# Patient Record
Sex: FEMALE | Race: WHITE | NOT HISPANIC OR LATINO | URBAN - METROPOLITAN AREA
[De-identification: names, ages, dates, MRNs, and addresses within clinical notes are randomized per-mention and may not be internally consistent; named-entity substitution may affect disease eponyms.]

---

## 2023-04-01 ENCOUNTER — HOSPITAL ENCOUNTER (EMERGENCY)
Facility: OTHER | Age: 48
Discharge: HOME | End: 2023-04-01
Attending: EMERGENCY MEDICINE
Payer: COMMERCIAL

## 2023-04-01 VITALS
WEIGHT: 195 LBS | BODY MASS INDEX: 27.92 KG/M2 | RESPIRATION RATE: 18 BRPM | HEIGHT: 70 IN | TEMPERATURE: 98 F | OXYGEN SATURATION: 98 % | DIASTOLIC BLOOD PRESSURE: 73 MMHG | HEART RATE: 70 BPM | SYSTOLIC BLOOD PRESSURE: 138 MMHG

## 2023-04-01 DIAGNOSIS — R05.9 COUGH: ICD-10-CM

## 2023-04-01 DIAGNOSIS — J06.9 VIRAL URI WITH COUGH: Primary | ICD-10-CM

## 2023-04-01 DIAGNOSIS — R07.9 CHEST PAIN: ICD-10-CM

## 2023-04-01 LAB
CTP QC/QA: YES
CTP QC/QA: YES
POC MOLECULAR INFLUENZA A AGN: NEGATIVE
POC MOLECULAR INFLUENZA B AGN: NEGATIVE
SARS-COV-2 RDRP RESP QL NAA+PROBE: NEGATIVE
TROPONIN I SERPL DL<=0.01 NG/ML-MCNC: <0.006 NG/ML (ref 0–0.03)

## 2023-04-01 PROCEDURE — 25000003 PHARM REV CODE 250: Performed by: EMERGENCY MEDICINE

## 2023-04-01 PROCEDURE — 99285 EMERGENCY DEPT VISIT HI MDM: CPT | Mod: 25

## 2023-04-01 PROCEDURE — 93010 ELECTROCARDIOGRAM REPORT: CPT | Mod: ,,, | Performed by: INTERNAL MEDICINE

## 2023-04-01 PROCEDURE — 84484 ASSAY OF TROPONIN QUANT: CPT | Performed by: EMERGENCY MEDICINE

## 2023-04-01 PROCEDURE — 93005 ELECTROCARDIOGRAM TRACING: CPT

## 2023-04-01 PROCEDURE — 93010 EKG 12-LEAD: ICD-10-PCS | Mod: 76,,, | Performed by: INTERNAL MEDICINE

## 2023-04-01 RX ORDER — FLUTICASONE PROPIONATE 50 MCG
1 SPRAY, SUSPENSION (ML) NASAL 2 TIMES DAILY PRN
Qty: 15 G | Refills: 0 | Status: SHIPPED | OUTPATIENT
Start: 2023-04-01

## 2023-04-01 RX ORDER — BENZONATATE 100 MG/1
100 CAPSULE ORAL 3 TIMES DAILY PRN
Qty: 20 CAPSULE | Refills: 0 | Status: SHIPPED | OUTPATIENT
Start: 2023-04-01 | End: 2023-04-11

## 2023-04-01 RX ORDER — IBUPROFEN 600 MG/1
600 TABLET ORAL
Status: COMPLETED | OUTPATIENT
Start: 2023-04-01 | End: 2023-04-01

## 2023-04-01 RX ORDER — CETIRIZINE HYDROCHLORIDE 10 MG/1
10 TABLET ORAL DAILY
Qty: 30 TABLET | Refills: 0 | Status: SHIPPED | OUTPATIENT
Start: 2023-04-01 | End: 2024-03-31

## 2023-04-01 RX ORDER — IBUPROFEN 600 MG/1
600 TABLET ORAL EVERY 8 HOURS
Qty: 30 TABLET | Refills: 0 | Status: SHIPPED | OUTPATIENT
Start: 2023-04-01 | End: 2023-04-11

## 2023-04-01 RX ADMIN — IBUPROFEN 600 MG: 600 TABLET, FILM COATED ORAL at 09:04

## 2023-04-01 NOTE — Clinical Note
"Lis Haywood" Db was seen and treated in our emergency department on 4/1/2023.  She may return to work on 04/03/2023.       If you have any questions or concerns, please don't hesitate to call.      Lefty White RN    "

## 2023-04-02 NOTE — ED PROVIDER NOTES
"SCRIBE #1 NOTE: I, Liang Tillman, am scribing for, and in the presence of,  Maikol Rubalcava DO. I have scribed the following portions of the note - Other sections scribed: HPI, PE.       Source of History:  Patient    Chief complaint:  Chest Pain (Patient presents to the ED with complaints of having chest pain, congestion, generalized weakness, nausea, sore throat, and headache. States symptoms started yesterday and has taken two home covid tests that were negative. ) and URI      HPI:  Lis Ace is a 47 y.o. female presenting with chest tightness beginning 3/30/23 and worsening this AM. She reports that her CP is worse with sitting upright. She also complains of intermittent episodes of nausea and diarrhea, an intermittent headache, and a mildly productive cough. She has also felt fatigued for approximately 1 week. She notes a Fhx of DM and HTN.     This is the extent to the patients complaints today here in the emergency department.    ROS:   See HPI.    Review of patient's allergies indicates:  No Known Allergies    PMH:  As per HPI and below:  No past medical history on file.  No past surgical history on file.         Physical Exam:    /74   Pulse 71   Temp 98.2 °F (36.8 °C) (Oral)   Resp 17   Ht 5' 10" (1.778 m)   Wt 88.5 kg (195 lb)   SpO2 97%   BMI 27.98 kg/m²   Nursing note and vital signs reviewed.  Constitutional: No acute distress.  Nontoxic  HENT: Some mild oropharyngeal erythema. No tonsillar swelling or exudates. Uvula midline. There is nasal congestion and turbinates are swollen.  Cardiovascular:  Regular rate and rhythm no murmurs gallops or rubs  Chest/ Respiratory:  Clear to auscultation bilaterally without wheezing rhonchi or rales  Abdomen: Soft.  Not distended.  Nontender.  No guarding.  No rebound. Non-peritoneal.  Extremities: No pitting edema  Neuro: Alert. No focal deficits.  Skin: no rash noted    I decided to obtain the patient's medical records.  Summary of Medical " Records:  04/01/2023 was able to review some of the records from [facility] in which the patient presented to earlier.  She had a CBC which showed no significant abnormalities.  Also a CMP which showed no significant abnormalities.  Was unable to visualize any chest x-ray or EKG and did not see a troponin ordered.  Was unable to see the results of the COVID test.    MDM/ Differential Dx:  47-year-old female with upper respiratory like symptoms.  Differential would include COVID, pneumonia, influenza.  Also possible pericarditis.  Will review records from [facility].  Will get an EKG as well as troponin and a chest x-ray.    Social Concerns:      ED Course as of 04/01/23 2321   Sat Apr 01, 2023   2147 X-Ray Chest PA And Lateral  Chest x-ray independently interpreted by myself shows normal cardiac size, no infiltrate or focal consolidation, no pneumothorax, no bony abnormalities.  Overall impression negative chest x-ray. []   2241 Troponin I: <0.006 []   2241 POC Molecular Influenza A Ag: Negative [SM]   2241 POC Molecular Influenza B Ag: Negative [SM]   2241 SARS-CoV-2 RNA, Amplification, Qual: Negative [SM]   2319 No further workup is indicated in the emergency department today.  I updated pt regarding results and I counseled pt regarding supportive care measures.  Diagnosis and treatment plan explained to patient. I have answered all questions and the patient is satisfied with the plan of care. Patient discharged home in stable condition.  [SM]      ED Course User Index  [SM] DO Francesca Solomon Attestation:   Scribe #1: I performed the above scribed service and the documentation accurately describes the services I performed. I attest to the accuracy of the note.  I, Dr Maikol Rubalcava, personally performed the services described in this documentation. All medical record entries made by the scribe were at my direction and in my presence. I have reviewed the  chart and agree that the record reflects my personal performance and is accurate and complete.   Diagnostic Impression:    1. Viral URI with cough    2. Chest pain    3. Cough         ED Disposition Condition    Discharge Stable            ED Prescriptions       Medication Sig Dispense Start Date End Date Auth. Provider    ibuprofen (ADVIL,MOTRIN) 600 MG tablet Take 1 tablet (600 mg total) by mouth every 8 (eight) hours. for 10 days 30 tablet 4/1/2023 4/11/2023 Maikol Rubalcava, DO    fluticasone propionate (FLONASE) 50 mcg/actuation nasal spray 1 spray (50 mcg total) by Each Nostril route 2 (two) times daily as needed (congestion). 15 g 4/1/2023 -- Maikol Rubalcava DO    benzonatate (TESSALON) 100 MG capsule Take 1 capsule (100 mg total) by mouth 3 (three) times daily as needed. 20 capsule 4/1/2023 4/11/2023 Maikol Rubalcava DO    cetirizine (ZYRTEC) 10 MG tablet Take 1 tablet (10 mg total) by mouth once daily. 30 tablet 4/1/2023 3/31/2024 Maikol Rubalcava, DO          Follow-up Information       Follow up With Specialties Details Why Contact Info    [facility] Internal Medicine, Family Medicine Schedule an appointment as soon as possible for a visit   [address] [address]  [phone]      [facility] Emergency Dept Emergency Medicine  If symptoms worsen [address] [id number] [phone]             Maikol Rubalcava DO  04/01/23 8087

## 2023-04-02 NOTE — ED NOTES
Patient reports having just left [facility] emergency room due to wait time. Reports having blood work, EKG, and chest xray completed.

## 2023-04-02 NOTE — ED TRIAGE NOTES
Pt reports having URI symptoms with cough, congestion, and SOB with CP starting today - reports pain to right chest worse with movement and deep breath

## 2024-09-10 ENCOUNTER — TELEPHONE (OUTPATIENT)
Dept: HEMATOLOGY/ONCOLOGY | Facility: CLINIC | Age: 49
End: 2024-09-10
Payer: COMMERCIAL

## 2024-09-27 ENCOUNTER — TELEPHONE (OUTPATIENT)
Dept: HEMATOLOGY/ONCOLOGY | Facility: CLINIC | Age: 49
End: 2024-09-27
Payer: COMMERCIAL

## 2024-09-27 NOTE — TELEPHONE ENCOUNTER
----- Message from Slime Srinivasan MA sent at 9/27/2024  1:04 PM CDT -----    ----- Message -----  From: Becky Mckeon  Sent: 9/27/2024  12:08 PM CDT  To: [facility] Genetics Clinical Support Staff    Good Afternoon,   Type:  Patient Returning Call    Who Called: Self     Who Left Message for Patient: Cassandra    Does the patient know what this is regarding?: scheduling Genetics appointment    Would the patient rather a call back or a response via [facility]? Call     Best Call Back Number:.[phone] (home)       Thank you   [facility]

## 2024-11-12 ENCOUNTER — TELEPHONE (OUTPATIENT)
Dept: HEMATOLOGY/ONCOLOGY | Facility: CLINIC | Age: 49
End: 2024-11-12
Payer: COMMERCIAL

## 2024-11-12 NOTE — TELEPHONE ENCOUNTER
Called patient and set up a genetic appointment----- Message from Junior Palencia sent at 11/12/2024  3:05 PM CST -----  Contact: FAX    ----- Message -----  From: Glenna Israel  Sent: 11/12/2024   3:03 PM CST  To: [facility] Genetics Clinical Support Staff    11/12/24      Pt called to schedule, referral in media mgr- phone .[phone] (home)           Thanks        Glenna        CC

## 2024-12-23 ENCOUNTER — TELEPHONE (OUTPATIENT)
Dept: HEMATOLOGY/ONCOLOGY | Facility: CLINIC | Age: 49
End: 2024-12-23
Payer: COMMERCIAL

## 2024-12-23 NOTE — TELEPHONE ENCOUNTER
Called patient and left message if she would like a sooner genetic  appointment to please call me back to let me know.

## 2024-12-26 ENCOUNTER — TELEPHONE (OUTPATIENT)
Dept: HEMATOLOGY/ONCOLOGY | Facility: CLINIC | Age: 49
End: 2024-12-26
Payer: COMMERCIAL

## 2024-12-27 ENCOUNTER — TELEPHONE (OUTPATIENT)
Dept: HEMATOLOGY/ONCOLOGY | Facility: CLINIC | Age: 49
End: 2024-12-27
Payer: COMMERCIAL

## 2024-12-27 NOTE — TELEPHONE ENCOUNTER
Called patient and left message to confirm the in office appointment on 12/30/24 and to complete the genetic questionnaire and to please call back to confirm the appointment.

## 2024-12-30 ENCOUNTER — OFFICE VISIT (OUTPATIENT)
Dept: HEMATOLOGY/ONCOLOGY | Facility: CLINIC | Age: 49
End: 2024-12-30
Payer: COMMERCIAL

## 2024-12-30 ENCOUNTER — LAB VISIT (OUTPATIENT)
Dept: LAB | Facility: HOSPITAL | Age: 49
End: 2024-12-30
Payer: COMMERCIAL

## 2024-12-30 DIAGNOSIS — Z84.89 FAMILY HISTORY OF GENETIC DISEASE: ICD-10-CM

## 2024-12-30 DIAGNOSIS — Z71.83 ENCOUNTER FOR NONPROCREATIVE GENETIC COUNSELING: ICD-10-CM

## 2024-12-30 LAB — MISCELLANEOUS GENETIC TEST NAME: NORMAL

## 2024-12-30 PROCEDURE — 36415 COLL VENOUS BLD VENIPUNCTURE: CPT | Performed by: GENETIC COUNSELOR, MS

## 2024-12-30 PROCEDURE — 99999 PR PBB SHADOW E&M-EST. PATIENT-LVL II: CPT | Mod: PBBFAC,,,

## 2024-12-30 PROCEDURE — 96040 PR GENETIC COUNSELING, EACH 30 MIN: CPT | Mod: S$GLB,,,

## 2024-12-30 NOTE — PROGRESS NOTES
Cancer Genetics  Hereditary and High-Risk Clinic  Department of Hematology and Oncology  [facility]    Date of Service:  24  Visit Provider:  [facility]    Patient ID  Name: Lis Ace    : 1975    MRN: [id number]      Referring Provider  Self, Aaareferral  No address on file    Televisit Information  The patient location is: [place].    The chief complaint leading to consultation is:  As below.    Visit type: In-person.      Face-to-face time with patient:  Approximately 50 minutes.    Approximately 75 minutes in total were spent on this encounter, which includes face-to-face time and non-face-to-face time preparing to see the patient (e.g., review of records and tests), obtaining and/or reviewing separately obtained history, documenting clinical information in the electronic or other health record, independently interpreting results (not separately reported) and communicating results to the patient/family/caregiver, or care coordination (not separately reported).  Each patient to whom he or she provides medical services by telemedicine is:  (1) informed of the relationship between the physician and patient and the respective role of any other health care provider with respect to management of the patient; and (2) notified that he or she may decline to receive medical services by telemedicine and may withdraw from such care at any time.    MIGUEL Lau is a pleasant 49 y.o. female. We discussed her personal health history, family health history, and the purpose and logistics of genetic testing for hereditary cancer syndromes. Her family history is significant for Lesly-Nighat Dubé (BHD) as her paternal aunt, her father, and her sister have diagnoses of BHD. Unfortunately, Judi is not able to retrieve a copy of her relatives genetic test report with the familial variant. Her personal health history is not suspicious of BHD as she has not experienced any  "of the lung, skin, or kidney manifestations of BHD, but she may be beginning to develop fibrofolliculomas on her face. A referral to dermatology was placed today for an overall skin exam as well as evaluation for fibrofolliculomas. With her family history of BHD she meets NCCN criteria for genetic testing and elected to proceed with the CancerNext - Expanded + RNAinsight panel offered by [email or web address].    FOCUSED PERSONAL HISTORY     Chief Complaint: Genetic Evaluation (Family history of Lesly-Nighat Dubé Syndro    History of Present Illness (HPI):  Lis Ace ("Osh"), 49 y.o., assigned female sex at birth, is new to the [facility] Department of Hematology and Oncology and to me.  She was referred by Primary Care for hereditary cancer risk assessment given her family history of Bmxg-Uumk-Tyré Syndrome (BHD).    Cancer History  No personal history of cancer   Os reported abnormal cervical findings, at trauma induced lipoma that was removed, and a thyroid nodule. I am not able to review the pathology or imaging reports.    Focused Medical History  Previous germline cancer genetic testing:  No  Colonoscopy: Yes - Performed at [facility] at age 49  Most recent colonoscopy: 11/2024 at [facility]   Colon polyp:  Yes - 3 polyps   Five year follow-up   Mammogram: Yes  Most recent mammogram: last in 2023  Breast MRI:  No  Pancreatitis:  No    Gynecologic History  Age at menarche:  13 years old  Age at first live childbirth: nulliparous  Menopausal status:  premenopausal  Reproductive organs:  Intact    Hormone Use  Testosterone for 1.5 years overseen by Dr. Brito    FAMILY HISTORY         Maternal ancestry: [place]  Paternal ancestry: [place]  Ashkenazi Sikh ancestry: None reported    A family history of birth defects, intellectual disability, SIDS, sudden early death, multiple miscarriages and consanguinity were denied. Please refer to above pedigree for further details. A larger copy has been scanned in the Media tab.     DISCUSSION "     Yzqi-Ruko-Jiyé Syndrome  Xixu-Ggmh-Yhké Syndrome (BHD) is a hereditary cancer syndrome caused by mutations in the FLCN gene. This gene encodes a protein called folliculin that helps keep tumors from forming by keeping cell growth under control. If a person has a mutation in the FLCN gene, the folliculin cannot do it's job properly. As a result, people with BHD are more likely to develop non-cancerous and cancerous tumors. Specifically, people with BHD are at an increased risk of benign skin tumors, lung cysts and pneumothorax, and kidney cancer/tumors.     Risks of BHD      Management of BHD  Skin: Establish care with a dermatologist who has experience working with patients who have BHD. They can recommend different treatment and management options availaible.  Lung: Establish care with a pulmonologist. They will assess for signs and symptoms  of lung cysts and pneumothorax, monitor and visualize the lungs.  Kidney: Annual abdominal/pelvic MRI to assess for renal lesions beginning at age 20 years or earlier in those with a family history of renal tumors before age 30 years.    Inheritance  BHD follows autosomal dominant inheritance. When an individual has a gene mutation, their first-degree relatives (parents, children, and siblings) each have a 50% chance of carrying the same mutation. Other, more distant blood relatives can also be at risk of carrying the same mutation. At-risk relatives of an individual with a mutation should consider genetic testing to help determine their risk for cancer.     Causes of Cancer:    Cancer occurs when cells grow out of control. Some genes help protect against cancer by controlling the growth of cells. However, mutations (problems) in these genes can prevent them from working properly, increasing the risk of developing cancer.  Sporadic Cancer: Most people who get cancer have sporadic cancer. Sporadic cancer is caused by mutations that occur during the lifetime. These mutations  may be caused by aging, environmental exposures (ex. UV radiation, carcinogens), lifestyle (ex. smoking, drinking alcohol, sunbathing, poor diet), other medical conditions (ex. hepatitis, HPV, ulcerative colitis), or other factors.   Hereditary Cancer: A small percentage (5-10%) of people who develop cancer were born with a mutation already in one of the cancer protection genes.  Familial Cancer: Cancer can also cluster in families that do not have an identifiable mutation. This may be due to shared environmental or lifestyle factors or genetic risk factors that have not been identified or cannot be detected using current technology or panels.     Judi meets NCCN criteria for genetic testing based on her family history of BHD. Therefore, she was offered single gene analysis and broad panel testing. We discussed the advantages, disadvantages, and limitations of each test. With that information, Judi opted for the CancerNext - Expanded + [employer] Innovationsnsight panel through [employer]. The following 76  genes associated with brain, breast, colon, ovarian, pancreatic, prostate, renal, uterine, and other cancers are included on this panel: AIP, ALK, APC, DORI, AXIN2, BAP1, BARD1, BMPR1A, BRCA1, BRCA2, BRIP1, CDC73, CDH1, CDK4, CDKN1B, CDKN2A, CEBPA ,CHEK2, CTNNA1, DDX41, DICER1, EGFR, EPCAM, ETV6, FH, FLCN, GATA2, GREM1, HOXB13, KIT, LZTR1, MAX, MBD4, MEN1, MET, MITF, MLH1, MSH2, MSH3, MSH6, MUTYH, NF1, NF2, NTHL1, PALB2, PDGFRA, PHOX2B, PMS2, POLD1, POLE, POT1, UUSKX6B, PTCH1, PTEN, RAD51C, RAD51D, RB1, RET, RUNX1, SDHA, SDHAF2, SDHB, SDHC, SDHD, SMAD4, SMARCA4, SMARCB1, SMARCE1, STK11, SUFU, NQBB683, TP53, TSC1, TSC2, VHL, WT1.     Possible Results:  Positive (pathogenic or likely pathogenic variant): A genetic variant was found that is suspected or known to impact the function of the gene. The impact of a positive result on an individual's risk of cancer varies based on the gene, specific variant, individual's sex assigned at  birth, personal cancer history, other health history (such as surgical history), and family history. A positive result can impact screening and risk management recommendations. However, there are not always available guidelines for management based on a specific gene variant. Family history and personal risk factors should always be considered. Sometimes, a positive result can also have treatment or reproductive implications.   Negative: No clinically significant variants were reported in the tested genes. A negative result does not indicate that an individual cannot develop cancer or even that the individual is at average risk. An individual may still be at an increased risk for cancer based on personal risk factors or family history. Additionally, there could be a hereditary cancer predisposition that was not included in a chosen panel or is not detected with current technology.   Variant of Uncertain Significance (VUS): A variant was found. However, the lab does not have enough information to determine if the variant is benign (harmless) or pathogenic (impacts the function of the gene). The laboratory may update (reclassify) the variant over time as more information becomes available. When reclassified, most variants of uncertain significance are reclassified to benign/likely benign. Typically, it is not recommended to  based on the presence of a VUS. The chance of finding a VUS varies based on the test performed. Generally, the chance of finding a VUS increases with the number of genes tested and decreases with the amount of testing of that gene (genes that are tested more frequently or for a longer period of time have a lower VUS rate).    With her father and sister having BHD, there is a 50% chance that Osh's genetic testing will be positive for the familial FLCN variant.    Genetic Discrimination:  The Genetic Information Nondiscrimination Act of 2008 (JASWINDER) is a [place] federal law that  "provides some protections against the use of an individual's genetic information by their health insurer and by their employer. Title I of JASWINDER prohibits most health insurers (except for insurance obtained through a job with the  or the Federal Employees Health Benefits Plan) from utilizing an individual's genetic information to make decisions regarding insurance eligibility or premium charges. Title II of JASWINDER prohibits covered entities from requesting or requiring the genetic information of employees and applicants and from using said information to make employment decisions. This does not apply to employers with fewer than 15 employees or to the .  JASWINDER also does not protect individuals from genetic discrimination by any other type of policy or entity, including but not limited to life insurance, disability insurance, long-term care insurance,  benefits, and [facility] benefits.    There is also a possibility for the patient to incur out-of-pocket costs related to this testing. Osh appeared to have a good understanding of the information as she asked appropriate questions.  Os received comprehensive counseling regarding panel testing and has elected to proceed with this testing. A sample was scheduled to be submitted on 12/20/2024 to [facility].  Os's results should be available in approximately 3 weeks.  In the meantime, she is welcome to contact me if she has any questions, concerns, or updates to her family history.       ASSESSMENT / PLAN      Lis Ace ("Osh"), 49 y.o., presented today for hereditary cancer risk assessment and genetic counseling given her family history of Lesly-Nighat Dubé syndrome. Her father, sister, and paternal aunt all have genetic diagnoses of BHD with various clinical manifestations. Osh does not know the familial variant and she is not magan to receive a copy of a lab report from an affected family member. Although Osh is 49 and has not " experienced any manifestations of BHD, she wants to get testing done so she can understand what her risks are and begin the appropriate screening and surveillance. She meets NCCN criteria for genetic testing and elected to proceed with the CancerNext - Expanded + RNAinsight offered by [employer]. If her results are negative, it will not be considered a true negative. This is because the familial variant is unknown, so I cannot tell [employer] what mutation to intentionally check for and rule out.      ICD-10-CM ICD-9-CM   1. Encounter for nonprocreative genetic counseling  Z71.83 V26.33   2. Family history of genetic disease  Z84.89 V19.8       Genetic Test Information  [facility]   Genetic test panel: [employer] CancerNext-Expanded +RNAinsight    Indication/ICD Codes:  Z84.89   Specimen type: Blood   Specimen collection by: [facility]    Specimen collection date: 12/30/24   Results expected by: Approximately 3 weeks after the genetic testing lab receives the specimen   Results disclosure plan: Post-test visit if positive or complex result; otherwise, results will be communicated through phone call   Verbal informed consent: Obtained     Follow-up:   I will call the patient in with the results of her genetic test. If the result is positive, a follow up appointment will be scheduled to discuss the details of the finding.    Questions were encouraged and answered to the patient's satisfaction, and she verbalized understanding of the information and agreement with the plan.         Approximately 50 minutes were spent face-to-face with the patient.  Approximately 75 minutes in total were spent on this encounter, which includes face-to-face time and non-face-to-face time preparing to see the patient (e.g., review of tests), obtaining and/or reviewing separately obtained history, documenting clinical information in the electronic or other health record, independently interpreting results (not  separately reported) and communicating results to the patient/family/caregiver, or care coordination (not separately reported).     This assessment is based on the history and reports provided, as well as the current scientific knowledge regarding cancer genetics.         Kevin Smith [facility]  Genetic Counselor, Hereditary and High-Risk Clinic  Department of Hematology and Oncology  [facility]

## 2025-01-27 ENCOUNTER — TELEPHONE (OUTPATIENT)
Dept: HEMATOLOGY/ONCOLOGY | Facility: CLINIC | Age: 50
End: 2025-01-27
Payer: COMMERCIAL

## 2025-01-27 NOTE — TELEPHONE ENCOUNTER
I called Osh to discuss the results of her genetic testing as well as recommendations based on the test results and her family history. I left a brief voicemail.

## 2025-01-30 ENCOUNTER — TELEPHONE (OUTPATIENT)
Dept: HEMATOLOGY/ONCOLOGY | Facility: CLINIC | Age: 50
End: 2025-01-30
Payer: COMMERCIAL

## 2025-02-05 ENCOUNTER — TELEPHONE (OUTPATIENT)
Dept: HEMATOLOGY/ONCOLOGY | Facility: CLINIC | Age: 50
End: 2025-02-05
Payer: COMMERCIAL

## 2025-02-05 NOTE — TELEPHONE ENCOUNTER
I called Osh to discuss the results of her genetic testing as well as recommendations based on the test results and her family history. I left a brief voicemail as she did not answer.

## 2025-02-17 ENCOUNTER — PATIENT MESSAGE (OUTPATIENT)
Dept: HEMATOLOGY/ONCOLOGY | Facility: CLINIC | Age: 50
End: 2025-02-17
Payer: COMMERCIAL

## 2025-02-17 ENCOUNTER — TELEPHONE (OUTPATIENT)
Dept: HEMATOLOGY/ONCOLOGY | Facility: CLINIC | Age: 50
End: 2025-02-17
Payer: COMMERCIAL

## 2025-02-17 NOTE — TELEPHONE ENCOUNTER
Cancer Genetics  Hereditary and High-Risk Clinic  Department of Hematology and Oncology  [facility]    Patient ID  Name: Lis Ace    : 1975    MRN: [id number]      IMPRESSION     Osh and I met on 2024 for pretest genetic counseling to discuss the family history of Lesly-Nighat Dubé (BHD) and genetic testing. Osh elected to proceed with the CancerNext - Expanded + RNAinishgt panel offered by [facility]. This panel includes the FLCN gene, which is the genetic cause of most BHD cases as well as 75 other genes associated with hereditary brain, breast, colon, ovarian, pancreatic, prostate, renal, uterine, and other cancers. The results were negative for actionable findings in any of these genes. One limitation of testing was the familial variant relating to her family history of BHD is unknown. It is also possible there is no FLCN gene mutation in her family. Without knowing and specifically analyzing the FLCN gene for the familial variant, BHD cannot be completely ruled out for Ospawel. Several attempts were made to disclose results and discuss recommendations over the phone. The dates are 2025, 2025, 2025, and 2025. She is welcome to call me to discuss her results or any questions/concerns.    DISCUSSION      Genetic Test Results    Ospawel had a sample submitted on 2024 to [facility] for CancerNext - Expanded panel testing. This panel includes sequencing and/or deletion/duplication analysis of the following 76 genes: AIP, ALK, APC, DORI, AXIN2, BAP1, BARD1, BMPR1A, BRCA1, BRCA2, BRIP1, CDC73, CDH1, CDK4, CDKN1B, CDKN2A, CEBPA ,CHEK2, CTNNA1, DDX41, DICER1, EGFR, EPCAM, ETV6, FH, FLCN, GATA2, GREM1, HOXB13, KIT, LZTR1, MAX, MBD4, MEN1, MET, MITF, MLH1, MSH2, MSH3, MSH6, MUTYH, NF1, NF2, NTHL1, PALB2, PDGFRA, PHOX2B, PMS2, POLD1, POLE, POT1, TZAQT1C, PTCH1, PTEN, RAD51C, RAD51D, RB1, RET, RUNX1, SDHA, SDHAF2, SDHB, SDHC, SDHD, SMAD4, SMARCA4, SMARCB1,  SMARCE1, STK11, SUFU, EJOL817, TP53, TSC1, TSC2, VHL, WT1.     The results were negative for actionable mutations in any of these genes. This is not considered a true negative as the familial FLCN mutation that may be causing BHD in the family was not specifically tested for. Although unlikely, it is possible this testing was not able to detect the familial variant. Also, approximately 4% of BHD cases do not have a genetic cause identified on genetic testing. This could be the case for Judi and her family as I was not able to confirm a familial pathogenic FLCN gene mutation.    BHD can be ruled out for Judi, if she is able to speak with her affected relatives and receive a copy of their genetic test report that includes the pathogenic FLCN mutation. Targeted analysis can then be performed for this mutation. This can be done for free by [facility] within 90 days of the report date of her genetic testing (1/11/2025).    Cancer Risks and Screening Recommendations for Judi  There are no referrals or recommendations with consideration of the family history of cancer, BHD, and negative genetic testing taken into consideration.     Judi should maintainer current breast, cervical, colorectal and skin cancer screenings overseen by her OBGYN, PCP, and dermatologist. She should follow population screening recommendations for other cancer screenings unless otherwise indicated or advised by a provider.     Recommendations for Family Members  Judi's close female relatives may still be at risk if having BHD. Her paternal relatives who have not had genetic testing done, may consider meeting with a genetic counselor to discuss their options.     Besides that specific recommendation, I recommend her  family members follow the general cancer screening recommendations unless otherwise indicated or advised by a provider.    General Cancer Screening Recommendations  Breast: National Comprehensive Cancer Network Guidelines (NCCN) recommend  individuals with average risk for breast cancer (<15%) have the following.   Breast awareness - be familiar with your breasts and report any changes to your healthcare providers.   <40 Clinical appointment every 1-3 years until age 40, preferably including a clinical breast exam.   Yearly clinical appointments starting at age 40, preferably including a clinical breast exam.   Yearly mammogram with tomosynthesis starting at age 40.  Consider supplemental screening for those with heterogenous or extremely dense breasts.     Cervical: American College of Obstetricians and Gynecologists (ACOG) and the [place] Preventative Services Task Force (USPSTF) recommend that people who have a cervix have the follow screening based on age. Individual risk factors and abnormal previous screening can alter these recommendations.   [Age 21-29] Pap test every 3 years.  [Age 30-65] Pap test and HPV test every 5 years OR Pap test every 3 years OR HPV test every 5 years.  [After age 65] No screening recommended if the individual has had adequate normal prior screening and does not have other high-risk factors.     Colorectal: The National Comprehensive Cancer Network (NCCN) and the [place] Preventative Services Task Force (USPSTF) recommend that people between age 45-75* at average risk** of colorectal cancer have screening through one of the following methods with the typical time until screening is needed again (for negative result/no polyps found) dependent on the method chosen. A positive result or identification of a polyp may change how often screening is recommended or what method of screening should be used.  Additionally, testing may be needed sooner for other reasons, including symptoms concerning for colorectal cancer.  Colonoscopy with the next screening in 10 years.   CT colonography with the next screening in 5 years.  Flexible sigmoidoscopy with the next screening in 5-10 years.  Stool testing (looking for blood such as a  guaiac-based test or a fecal immunochemical test) with the next screening in 1 year.  Stool testing (DNA based) with the next screening in 3 years.  *Between age 76-85, screening should be selectively offered based on overall health, prior screening history, and patient preferences.   **Personal history of inflammatory bowel disease, cystic fibrosis, or polyps or family history of colorectal polyps or cancer may increase the risk of colorectal cancer and have different screening recommendations.    Prostate: The National Comprehensive Cancer Network (NCCN) recommends that individuals with an average risk of prostate cancer have a discussion with their doctors about the risks and benefits of prostate cancer early detection including prostate specific antigen (PSA) testing with or without digital rectal examination (PATRIA) starting at age 45. Follow up is based on age and findings.     Skin: The American Academy of Dermatology encourages regular skin self-exams and reporting any new spots to a healthcare provider.     References:  ACOG Cervical Cancer Screening Guidelines. Last updated April 2021. Reaffirmed April 2024. https://www.acog.org/clinical/clinical-guidance/practice-advisory/articles/2021/04/updated-cervical-cancer-screening-guidelines  National Comprehensive Cancer Network (NCCN). (2024). Breast Cancer Screening and Diagnosis. NCCN Clinical Practice Guidelines in Oncology (NCCN Guidelines), Version 2.2024.  National Comprehensive Cancer Network (NCCN). (2024). Prostate cancer early detection. NCCN Clinical Practice Guidelines in Oncology (NCCN Guidelines), Version 2.2024.  National Comprehensive Cancer Network (NCCN). (2023). Genetic/familial high-risk assessment: Colorectal. NCCN Clinical Practice Guidelines in Oncology (NCCN Guidelines), Version 2.2023.  USPSTF Final Recommendation Statement - Cervical Cancer: Screening. Last updated 8/21/2018.  https://www.uspreventiveservicestaskforce.org/uspstf/recommendation/cervical-cancer-screening  USPSTF Final Recommendation Statement - Colorectal Cancer: Screening. Last updated 5/18/2021.  https://www.uspreventiveservicestaskforce.org/uspstf/recommendation/colorectal-cancer-screening        Ospawel received  an electronic copy of her results report. Osh is encouraged to contact cancer genetics if there are any updates to her personal or family history, or if she has any questions or concerns.    This assessment is based on the history and reports provided, as well as the current scientific knowledge regarding cancer genetics.    [facility] [phone]  Genetic Counselor, Hereditary and High-Risk Clinic  Department of Hematology and Oncology  [facility]        CC:  No ref. provider found

## 2025-06-24 ENCOUNTER — TELEPHONE (OUTPATIENT)
Dept: HEMATOLOGY/ONCOLOGY | Facility: CLINIC | Age: 50
End: 2025-06-24
Payer: COMMERCIAL

## 2025-06-24 NOTE — TELEPHONE ENCOUNTER
Patient had concerns about insurance coverage and the cost of genetic testing. I contacted the [employer] representative, and she assured that [facility] will pay no more than $100 for genetic testing.

## 2025-08-15 ENCOUNTER — TELEPHONE (OUTPATIENT)
Dept: DERMATOLOGY | Facility: CLINIC | Age: 50
End: 2025-08-15
Payer: COMMERCIAL